# Patient Record
Sex: FEMALE
[De-identification: names, ages, dates, MRNs, and addresses within clinical notes are randomized per-mention and may not be internally consistent; named-entity substitution may affect disease eponyms.]

---

## 2020-09-15 PROBLEM — Z00.00 ENCOUNTER FOR PREVENTIVE HEALTH EXAMINATION: Status: ACTIVE | Noted: 2020-09-15

## 2020-09-18 ENCOUNTER — APPOINTMENT (OUTPATIENT)
Dept: ORTHOPEDIC SURGERY | Facility: CLINIC | Age: 55
End: 2020-09-18
Payer: OTHER MISCELLANEOUS

## 2020-09-18 VITALS — BODY MASS INDEX: 36.7 KG/M2 | RESPIRATION RATE: 16 BRPM | HEIGHT: 64 IN | WEIGHT: 215 LBS

## 2020-09-18 DIAGNOSIS — Z78.9 OTHER SPECIFIED HEALTH STATUS: ICD-10-CM

## 2020-09-18 DIAGNOSIS — Z80.49 FAMILY HISTORY OF MALIGNANT NEOPLASM OF OTHER GENITAL ORGANS: ICD-10-CM

## 2020-09-18 DIAGNOSIS — Z82.61 FAMILY HISTORY OF ARTHRITIS: ICD-10-CM

## 2020-09-18 DIAGNOSIS — Z87.448 PERSONAL HISTORY OF OTHER DISEASES OF URINARY SYSTEM: ICD-10-CM

## 2020-09-18 DIAGNOSIS — Z80.0 FAMILY HISTORY OF MALIGNANT NEOPLASM OF DIGESTIVE ORGANS: ICD-10-CM

## 2020-09-18 PROCEDURE — 73130 X-RAY EXAM OF HAND: CPT | Mod: 50

## 2020-09-18 PROCEDURE — 99214 OFFICE O/P EST MOD 30 MIN: CPT

## 2020-09-18 RX ORDER — TOPIRAMATE 50 MG/1
TABLET, FILM COATED ORAL
Refills: 0 | Status: ACTIVE | COMMUNITY

## 2020-09-18 RX ORDER — NARATRIPTAN 2.5 MG/1
TABLET, FILM COATED ORAL
Refills: 0 | Status: ACTIVE | COMMUNITY

## 2020-09-18 RX ORDER — CETIRIZINE HCL 10 MG
TABLET ORAL
Refills: 0 | Status: ACTIVE | COMMUNITY

## 2020-10-02 ENCOUNTER — APPOINTMENT (OUTPATIENT)
Dept: ORTHOPEDIC SURGERY | Facility: CLINIC | Age: 55
End: 2020-10-02
Payer: OTHER MISCELLANEOUS

## 2020-10-02 VITALS — BODY MASS INDEX: 36.7 KG/M2 | RESPIRATION RATE: 16 BRPM | HEIGHT: 64 IN | WEIGHT: 215 LBS

## 2020-10-02 DIAGNOSIS — M25.531 PAIN IN RIGHT WRIST: ICD-10-CM

## 2020-10-02 PROCEDURE — 73110 X-RAY EXAM OF WRIST: CPT | Mod: 50

## 2020-10-02 PROCEDURE — 73070 X-RAY EXAM OF ELBOW: CPT | Mod: RT

## 2020-10-02 PROCEDURE — 20605 DRAIN/INJ JOINT/BURSA W/O US: CPT | Mod: RT

## 2020-10-02 PROCEDURE — 99214 OFFICE O/P EST MOD 30 MIN: CPT | Mod: 25

## 2020-10-16 ENCOUNTER — APPOINTMENT (OUTPATIENT)
Dept: ORTHOPEDIC SURGERY | Facility: CLINIC | Age: 55
End: 2020-10-16
Payer: OTHER MISCELLANEOUS

## 2020-10-16 VITALS — RESPIRATION RATE: 16 BRPM | WEIGHT: 215 LBS | HEIGHT: 64 IN | BODY MASS INDEX: 36.7 KG/M2

## 2020-10-16 DIAGNOSIS — G56.01 CARPAL TUNNEL SYNDROME, RIGHT UPPER LIMB: ICD-10-CM

## 2020-10-16 DIAGNOSIS — M77.01 MEDIAL EPICONDYLITIS, RIGHT ELBOW: ICD-10-CM

## 2020-10-16 PROCEDURE — 99214 OFFICE O/P EST MOD 30 MIN: CPT

## 2020-11-06 ENCOUNTER — APPOINTMENT (OUTPATIENT)
Dept: ORTHOPEDIC SURGERY | Facility: CLINIC | Age: 55
End: 2020-11-06
Payer: OTHER MISCELLANEOUS

## 2020-11-06 VITALS — BODY MASS INDEX: 36.7 KG/M2 | HEIGHT: 64 IN | WEIGHT: 215 LBS | RESPIRATION RATE: 16 BRPM

## 2020-11-06 PROCEDURE — 99214 OFFICE O/P EST MOD 30 MIN: CPT | Mod: 25

## 2020-11-06 PROCEDURE — 99072 ADDL SUPL MATRL&STAF TM PHE: CPT

## 2020-11-06 PROCEDURE — 20550 NJX 1 TENDON SHEATH/LIGAMENT: CPT | Mod: F9

## 2020-11-08 ENCOUNTER — FORM ENCOUNTER (OUTPATIENT)
Age: 55
End: 2020-11-08

## 2020-12-08 ENCOUNTER — APPOINTMENT (OUTPATIENT)
Dept: ORTHOPEDIC SURGERY | Facility: CLINIC | Age: 55
End: 2020-12-08
Payer: OTHER MISCELLANEOUS

## 2020-12-08 VITALS — RESPIRATION RATE: 16 BRPM | WEIGHT: 215 LBS | BODY MASS INDEX: 36.7 KG/M2 | HEIGHT: 64 IN

## 2020-12-08 PROCEDURE — 99214 OFFICE O/P EST MOD 30 MIN: CPT | Mod: 25

## 2020-12-08 PROCEDURE — 20550 NJX 1 TENDON SHEATH/LIGAMENT: CPT | Mod: F9

## 2020-12-08 PROCEDURE — 99072 ADDL SUPL MATRL&STAF TM PHE: CPT

## 2020-12-08 NOTE — HISTORY OF PRESENT ILLNESS
[Right] : right hand dominant [FreeTextEntry1] : Pt here for recurrent right IV and V, mostly V TF despite 1 previous on November 6.  Pt is 4 weeks s/p injection.

## 2020-12-08 NOTE — PHYSICAL EXAM
[de-identified] : Physical exam shows the patient to be alert and oriented x3, capable of ambulation. The patient is well-developed and well-nourished in no apparent respiratory distress. Majority of the skin is intact bilaterally in the upper extremities without lymphadenopathy at the elbows.\par \par There is a negative Spurling test. There is no sensitivity or Tinel's over the axilla bilaterally in the area of the brachial plexus.\par \par The elbows have a symmetric and full range of motion with no pain upon forced flexion or extension bilaterally. There is no tenderness over the medial or lateral epicondyles bilaterally. The biceps and triceps are intact bilaterally with 5 over 5 strength. There is no joint effusion or instability of the medial and lateral collateral ligament complex bilaterally. There is no sensitivity of the median ulnar or radial nerves with provocative tests bilaterally.\par \par The wrists have a symmetric range of motion bilaterally. There is no tenderness over the scaphoid, scapholunate or lunotriquetral ligaments bilaterally. There is a negative Su test bilaterally. There is negative tenderness over the radial ulnar joint or TFCC and no evidence of instability bilaterally. No tenderness over the pisotriquetral or hamate hook or CMC joint bilaterally. There is 5 over 5 strength of the wrists bilaterally.\par \par There is a negative Finkelstein test bilaterally. There is no tenderness or instability of the MP PIP or DIP joints in the digits bilaterally with no evidence of digital malrotation.\par There is tenderness to the A1 pulley of the right fourth and fifth digits without locking. Upon compression. No instability of the joint or tendon, symmetric bilaterally\par There is no sensitivity or masses around the ulnar nerve at the level of the wrist bilaterally.\par \par \par There is 2+ pulses over the radial arteries bilaterally with good capillary refill.\par \par There is a positive Tinel's and a positive Phalen's test at 15 seconds on the right. There is also thenar atrophy but good opposition is present. The remaining intrinsic musculature has good strength bilaterally.\par \par Sensation is diminished in the median nerve distribution on the affected side. Ulnar nerve sensation is grossly intact\par

## 2020-12-08 NOTE — ASSESSMENT
[FreeTextEntry1] : Recurrent right fourth and fifth trigger fingers with one previous injection\par \par Persistent right carpal tunnel syndrome. Despite activity modifications, and splinting.\par \par The risks, benefits, alternatives and associated differential diagnosis was discussed with the patient. Options ranged from conservative care to surgical intervention were reviewed and all questions answered. Patient appeared to have an excellent understanding of the risks as well as differential diagnosis associated with this condition.\par \par She elected to proceed with a second injection for the right fourth and fifth trigger finger. Continued conservative management and splinting for the carpal tunnel.\par \par If symptoms recur. She is either considering a third injection or surgery.\par \par Surgery discussed was a right fourth and fifth trigger finger release and right carpal tunnel release

## 2021-01-13 ENCOUNTER — APPOINTMENT (OUTPATIENT)
Dept: ORTHOPEDIC SURGERY | Facility: CLINIC | Age: 56
End: 2021-01-13
Payer: OTHER MISCELLANEOUS

## 2021-01-13 VITALS — BODY MASS INDEX: 37.56 KG/M2 | HEIGHT: 64 IN | WEIGHT: 220 LBS

## 2021-01-13 DIAGNOSIS — M65.351 TRIGGER FINGER, RIGHT LITTLE FINGER: ICD-10-CM

## 2021-01-13 DIAGNOSIS — M65.341 TRIGGER FINGER, RIGHT RING FINGER: ICD-10-CM

## 2021-01-13 PROCEDURE — 99072 ADDL SUPL MATRL&STAF TM PHE: CPT

## 2021-01-13 PROCEDURE — 99214 OFFICE O/P EST MOD 30 MIN: CPT

## 2023-08-21 NOTE — REASON FOR VISIT
[Follow-Up Visit] : a follow-up visit for [Worker's Compensation] : this visit is related to worker's compensation [Trigger Finger] : trigger finger none

## 2023-12-07 ENCOUNTER — APPOINTMENT (OUTPATIENT)
Dept: HEPATOLOGY | Facility: CLINIC | Age: 58
End: 2023-12-07
Payer: COMMERCIAL

## 2023-12-07 VITALS
OXYGEN SATURATION: 98 % | BODY MASS INDEX: 38.93 KG/M2 | HEIGHT: 64 IN | RESPIRATION RATE: 17 BRPM | DIASTOLIC BLOOD PRESSURE: 84 MMHG | TEMPERATURE: 97.8 F | SYSTOLIC BLOOD PRESSURE: 138 MMHG | WEIGHT: 228 LBS | HEART RATE: 84 BPM

## 2023-12-07 LAB
A1AT SERPL-MCNC: 140 MG/DL
ALBUMIN SERPL ELPH-MCNC: 4.7 G/DL
ALP BLD-CCNC: 146 U/L
ALT SERPL-CCNC: 15 U/L
ANION GAP SERPL CALC-SCNC: 12 MMOL/L
AST SERPL-CCNC: 17 U/L
BASOPHILS # BLD AUTO: 0.07 K/UL
BASOPHILS NFR BLD AUTO: 1 %
BILIRUB DIRECT SERPL-MCNC: 0.1 MG/DL
BILIRUB INDIRECT SERPL-MCNC: 0.5 MG/DL
BILIRUB SERPL-MCNC: 0.7 MG/DL
BUN SERPL-MCNC: 13 MG/DL
CALCIUM SERPL-MCNC: 10.1 MG/DL
CERULOPLASMIN SERPL-MCNC: 30 MG/DL
CHLORIDE SERPL-SCNC: 105 MMOL/L
CO2 SERPL-SCNC: 26 MMOL/L
CREAT SERPL-MCNC: 0.88 MG/DL
EGFR: 77 ML/MIN/1.73M2
EOSINOPHIL # BLD AUTO: 0.04 K/UL
EOSINOPHIL NFR BLD AUTO: 0.6 %
GGT SERPL-CCNC: 22 U/L
GLUCOSE SERPL-MCNC: 100 MG/DL
HCT VFR BLD CALC: 45.6 %
HGB BLD-MCNC: 15.2 G/DL
IMM GRANULOCYTES NFR BLD AUTO: 0.1 %
LYMPHOCYTES # BLD AUTO: 2.39 K/UL
LYMPHOCYTES NFR BLD AUTO: 33.5 %
MAN DIFF?: NORMAL
MCHC RBC-ENTMCNC: 31.8 PG
MCHC RBC-ENTMCNC: 33.3 GM/DL
MCV RBC AUTO: 95.4 FL
MONOCYTES # BLD AUTO: 0.58 K/UL
MONOCYTES NFR BLD AUTO: 8.1 %
NEUTROPHILS # BLD AUTO: 4.04 K/UL
NEUTROPHILS NFR BLD AUTO: 56.7 %
PLATELET # BLD AUTO: 269 K/UL
POTASSIUM SERPL-SCNC: 4.4 MMOL/L
PROT SERPL-MCNC: 7.5 G/DL
RBC # BLD: 4.78 M/UL
RBC # FLD: 13.1 %
SODIUM SERPL-SCNC: 143 MMOL/L
WBC # FLD AUTO: 7.13 K/UL

## 2023-12-07 PROCEDURE — 99204 OFFICE O/P NEW MOD 45 MIN: CPT

## 2023-12-07 PROCEDURE — 76981 USE PARENCHYMA: CPT

## 2023-12-08 LAB
HEPATITIS A IGG ANTIBODY: NONREACTIVE
INR PPP: 0.96 RATIO
PT BLD: 10.9 SEC

## 2023-12-08 RX ORDER — ALBUTEROL 90 MCG
90 AEROSOL (GRAM) INHALATION
Refills: 0 | Status: ACTIVE | COMMUNITY

## 2023-12-08 RX ORDER — ROSUVASTATIN CALCIUM 5 MG/1
5 TABLET, FILM COATED ORAL
Refills: 0 | Status: ACTIVE | COMMUNITY

## 2023-12-10 LAB
ANA SER IF-ACNC: NEGATIVE
MITOCHONDRIA AB SER IF-ACNC: NORMAL

## 2023-12-12 LAB — SOLUBLE LIVER IGG SER IA-ACNC: 0.8

## 2024-01-11 ENCOUNTER — APPOINTMENT (OUTPATIENT)
Dept: HEPATOLOGY | Facility: CLINIC | Age: 59
End: 2024-01-11
Payer: COMMERCIAL

## 2024-01-11 VITALS
BODY MASS INDEX: 38.93 KG/M2 | HEART RATE: 87 BPM | OXYGEN SATURATION: 96 % | HEIGHT: 64 IN | RESPIRATION RATE: 16 BRPM | DIASTOLIC BLOOD PRESSURE: 85 MMHG | SYSTOLIC BLOOD PRESSURE: 142 MMHG | TEMPERATURE: 97.2 F | WEIGHT: 228 LBS

## 2024-01-11 PROCEDURE — 99214 OFFICE O/P EST MOD 30 MIN: CPT

## 2024-01-11 NOTE — HISTORY OF PRESENT ILLNESS
[de-identified] : History of Present Illness     Pt is a 58 year female with PMH class II obesity, HLD, migraines, who presents for initial visit for evaluation/management of elevated ALP. PCP: Dr. Janet Armas GI: Dr. Jacky Watson  The following information was obtained from review of pt's electronic medical records from the patient portal (on pt's phone): -9/26/23: WBC 7.0, Hgb 15.4, Plt 259. Na 144, K 4.1, Cr 0.96. AST/ALT 16/16, TB 0.6, , Alb 4.7. Ferritin 79, fe 99, iron sat 30% HBcAb total neg, HbsAg neg, HBsAb < 3.1 (HBV nonimmune). HCV Ab neg A1c 5.6%. Total cholesterol 145, HDL 59, TG 88, LDL 69. TSH wnl. UA neg. B12 wnl. Folic acid wnl. 25-OH Vit D 31.4. -10/14/23: Na 143, K 4.3, Cr 0.93. AST/ALT 17/14, TB 0.4, , Alb 4.7, TP 7.4. ALP isoenzyme liver fraction 65%, bone fraction 35%, intestinal fraction 0%. ASMA neg. IgG wnl, IgM wnl.  Liver Hx: Reports that she was told by her PCP that her ALP was high, was referred here. First told LFTs abnormal in 9/2023. Reports that she had been getting annual labs with her prior PCP (who passed away in 6/2023), she was never told her LFTs were abnormal. Had recent mild COVID infection over Thanksgiving. Denies f/c, cp, sob, cough, abd pain, n/v/d, hematochezia/melena. Denies pruritus. Prior liver biopsy: never Last EGD: 2022 (gets this done every 2 years for GERD; with Dr. Watson) - hiatal hernia, small gastric polyp removed, otherwise unremarkable. Polyp was benign Last COL: 2022 (Dr. Watson) - 1 small polyp removed. Otherwise exam was unremarkable. Gets this done every 2 years due to hx of CRC in father.  Had Abd US done at [facility] in 10/2023 - was told the "liver looked gray" Reports she gained 25# since 2020 due to COVID pandemic and menopause In her teens was 120-125#. Gained weight in her 30s with pregnancy. Hurt her knee and stopped dancing in late 40s. At that time weighed 170#.  Pharmacy:  [address]  Family Hx: Father - CRC (dx in his 50s), had AUD Denies family hx of liver disease, cirrhosis, liver cancer, other GI primary malignancy. Denies family hx of IBD, autoimmune disease.  Social Hx Tobacco: never Alcohol: 4 drinks/month. Denies hx of heavy EtOH use Drugs: occasional marijuana (smoke) 2x/month. Denies use of other illicit drugs in past/present. 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 Lives with 2 daughters (age 23, 32). Engaged, her partner who lives separately.  PSH: sinus surgery b/l hammertoe surgery uterine fibroid removal  Allergies: Codeine - chest tightness, SOB (no anaphylaxis) macrobid - hives Vicodin - generalized pruritus  CURRENT MEDS: topirate 25mg qhs - migraines -- started in 2019 naratriptan PRN migraines -- started in 2020 crestor 5mg daily - started in 6/2023 zyrtec PRN albuterol inhaler PRN "mushroom coffee" from [employer] - started 2 months ago - 10/2023 Deneis use of other herbals/supplements

## 2024-01-11 NOTE — ASSESSMENT
[FreeTextEntry1] : Patient feels ok No new event no NVD No rectal bleeding  No chest pain or SOB  No cough No dizziness or confusion No  symptoms   ROS otherwise negative   Meds reviewed  Labs and imaging reviewed   Impression/suggestion Elevated liver enzymes with elevated alkaline phosphatase but normal GGT ALT and AST are normal Sonogram did not show any evidence of biliary disease FibroScan did not show fibrosis or fatty liver This could be related to and nonhepatic source considering a normal GGT due to meds.  She ha s stopped taking topiramate.  Check labs including alkaline phosphatase fractionation test in 4 to 6 weeks.  If alkaline phosphatase is elevated and is from hepatic source, MRI MRCP will be done.

## 2024-04-11 ENCOUNTER — APPOINTMENT (OUTPATIENT)
Dept: HEPATOLOGY | Facility: CLINIC | Age: 59
End: 2024-04-11
Payer: COMMERCIAL

## 2024-04-11 VITALS
HEART RATE: 93 BPM | BODY MASS INDEX: 38.76 KG/M2 | WEIGHT: 227 LBS | OXYGEN SATURATION: 97 % | HEIGHT: 64 IN | SYSTOLIC BLOOD PRESSURE: 140 MMHG | TEMPERATURE: 97.6 F | DIASTOLIC BLOOD PRESSURE: 84 MMHG | RESPIRATION RATE: 16 BRPM

## 2024-04-11 DIAGNOSIS — R79.89 OTHER SPECIFIED ABNORMAL FINDINGS OF BLOOD CHEMISTRY: ICD-10-CM

## 2024-04-11 DIAGNOSIS — E87.5 HYPERKALEMIA: ICD-10-CM

## 2024-04-11 LAB
ALBUMIN SERPL ELPH-MCNC: 4.5 G/DL
ALP BLD-CCNC: 125 IU/L
ALP BLD-CCNC: 130 U/L
ALP BONE CFR SERPL: 29 %
ALP INTEST CFR SERPL: 0 %
ALP LIVER CFR SERPL: 71 %
ALT SERPL-CCNC: 15 U/L
ANION GAP SERPL CALC-SCNC: 11 MMOL/L
AST SERPL-CCNC: 16 U/L
BILIRUB SERPL-MCNC: 0.5 MG/DL
BUN SERPL-MCNC: 12 MG/DL
CALCIUM SERPL-MCNC: 10.2 MG/DL
CHLORIDE SERPL-SCNC: 103 MMOL/L
CO2 SERPL-SCNC: 28 MMOL/L
CREAT SERPL-MCNC: 0.95 MG/DL
EGFR: 70 ML/MIN/1.73M2
GLUCOSE SERPL-MCNC: 96 MG/DL
HCT VFR BLD CALC: 45.5 %
HGB BLD-MCNC: 14.9 G/DL
MCHC RBC-ENTMCNC: 31.2 PG
MCHC RBC-ENTMCNC: 32.7 GM/DL
MCV RBC AUTO: 95.4 FL
PLATELET # BLD AUTO: 287 K/UL
POTASSIUM SERPL-SCNC: 5.4 MMOL/L
PROT SERPL-MCNC: 7.2 G/DL
RBC # BLD: 4.77 M/UL
RBC # FLD: 13.3 %
SODIUM SERPL-SCNC: 142 MMOL/L
WBC # FLD AUTO: 6.69 K/UL

## 2024-04-11 PROCEDURE — 99213 OFFICE O/P EST LOW 20 MIN: CPT

## 2024-04-12 PROBLEM — R79.89 ELEVATED LFTS: Status: ACTIVE | Noted: 2023-12-07

## 2024-04-12 NOTE — HISTORY OF PRESENT ILLNESS
[de-identified] : Pt is a 59 year female with PMH class II obesity, HLD, migraines, who presents for initial visit for evaluation/management of elevated ALP. PCP: Dr. Janet Armas GI: Dr. Jacky Watson  The following information was obtained from review of pt's electronic medical records from the patient portal (on pt's phone): -9/26/23: WBC 7.0, Hgb 15.4, Plt 259. Na 144, K 4.1, Cr 0.96. AST/ALT 16/16, TB 0.6, , Alb 4.7. Ferritin 79, fe 99, iron sat 30% HBcAb total neg, HbsAg neg, HBsAb < 3.1 (HBV nonimmune). HCV Ab neg A1c 5.6%. Total cholesterol 145, HDL 59, TG 88, LDL 69. TSH wnl. UA neg. B12 wnl. Folic acid wnl. 25-OH Vit D 31.4. -10/14/23: Na 143, K 4.3, Cr 0.93. AST/ALT 17/14, TB 0.4, , Alb 4.7, TP 7.4. ALP isoenzyme liver fraction 65%, bone fraction 35%, intestinal fraction 0%. ASMA neg. IgG wnl, IgM wnl.  Liver Hx: Reports that she was told by her PCP that her ALP was high, was referred here. First told LFTs abnormal in 9/2023. Reports that she had been getting annual labs with her prior PCP (who passed away in 6/2023), she was never told her LFTs were abnormal. Had recent mild COVID infection over Thanksgiving. Denies f/c, cp, sob, cough, abd pain, n/v/d, hematochezia/melena. Denies pruritus. Prior liver biopsy: never Last EGD: 2022 (gets this done every 2 years for GERD; with Dr. Watson) - hiatal hernia, small gastric polyp removed, otherwise unremarkable. Polyp was benign Last COL: 2022 (Dr. Watson) - 1 small polyp removed. Otherwise exam was unremarkable. Gets this done every 2 years due to hx of CRC in father.  Had Abd US done at [facility] in 10/2023 - was told the "liver looked gray" Reports she gained 25# since 2020 due to COVID pandemic and menopause In her teens was 120-125#. Gained weight in her 30s with pregnancy. Hurt her knee and stopped dancing in late 40s. At that time weighed 170#.  Pharmacy:  [address]  Family Hx: Father - CRC (dx in his 50s), had AUD Denies family hx of liver disease, cirrhosis, liver cancer, other GI primary malignancy. Denies family hx of IBD, autoimmune disease.  Social Hx Tobacco: never Alcohol: 4 drinks/month. Denies hx of heavy EtOH use Drugs: occasional marijuana (smoke) 2x/month. Denies use of other illicit drugs in past/present. 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 Lives with 2 daughters (age 23, 32). Engaged, her partner who lives separately.  PSH: sinus surgery b/l hammertoe surgery uterine fibroid removal  Allergies: Codeine - chest tightness, SOB (no anaphylaxis) macrobid - hives Vicodin - generalized pruritus  CURRENT MEDS: naratriptan PRN migraines -- started in 2020 crestor 5mg daily - started in 6/2023 zyrtec PRN albuterol inhaler PRN "mushroom coffee" from [employer] - started 2 months ago - 10/2023 Deneis use of other herbals/supplements   Interval Hx 4/11/2024 Pt is doing much better than previously. Her labs are showing improvement for ALP. It has decreased from >145 to 130 now, In the interval, her migraines have improved leading her to be weaned off the topiramate. Given that Topiramate is may cause LFT elevation, this was likely the cause. As she is improving, she is returned to primary care. Of note, her potassium was elevated to 5.4 which is above her normal of ~4 previously. She recently had URI and dental instrumentation which is why it may be elevated. Repeat BMP ordered.

## 2024-04-12 NOTE — ASSESSMENT
[FreeTextEntry1] : 59F with drug induced Alk phos elevation improving since she was taken off topiramate. Potassium is measured above normal values 5.4 Patient was advised about the importance of repeating the labs in less than a week Sono did not show any biliary issues  Repeat BMP next week Return to primary care to follow labs. Monitor liver tests

## 2024-04-12 NOTE — HISTORY OF PRESENT ILLNESS
[de-identified] : Pt is a 59 year female with PMH class II obesity, HLD, migraines, who presents for initial visit for evaluation/management of elevated ALP. PCP: Dr. Janet Armas GI: Dr. Jacky Watson  The following information was obtained from review of pt's electronic medical records from the patient portal (on pt's phone): -9/26/23: WBC 7.0, Hgb 15.4, Plt 259. Na 144, K 4.1, Cr 0.96. AST/ALT 16/16, TB 0.6, , Alb 4.7. Ferritin 79, fe 99, iron sat 30% HBcAb total neg, HbsAg neg, HBsAb < 3.1 (HBV nonimmune). HCV Ab neg A1c 5.6%. Total cholesterol 145, HDL 59, TG 88, LDL 69. TSH wnl. UA neg. B12 wnl. Folic acid wnl. 25-OH Vit D 31.4. -10/14/23: Na 143, K 4.3, Cr 0.93. AST/ALT 17/14, TB 0.4, , Alb 4.7, TP 7.4. ALP isoenzyme liver fraction 65%, bone fraction 35%, intestinal fraction 0%. ASMA neg. IgG wnl, IgM wnl.  Liver Hx: Reports that she was told by her PCP that her ALP was high, was referred here. First told LFTs abnormal in 9/2023. Reports that she had been getting annual labs with her prior PCP (who passed away in 6/2023), she was never told her LFTs were abnormal. Had recent mild COVID infection over Thanksgiving. Denies f/c, cp, sob, cough, abd pain, n/v/d, hematochezia/melena. Denies pruritus. Prior liver biopsy: never Last EGD: 2022 (gets this done every 2 years for GERD; with Dr. Watson) - hiatal hernia, small gastric polyp removed, otherwise unremarkable. Polyp was benign Last COL: 2022 (Dr. Watson) - 1 small polyp removed. Otherwise exam was unremarkable. Gets this done every 2 years due to hx of CRC in father.  Had Abd US done at [facility] in 10/2023 - was told the "liver looked gray" Reports she gained 25# since 2020 due to COVID pandemic and menopause In her teens was 120-125#. Gained weight in her 30s with pregnancy. Hurt her knee and stopped dancing in late 40s. At that time weighed 170#.  Pharmacy:  [address]  Family Hx: Father - CRC (dx in his 50s), had AUD Denies family hx of liver disease, cirrhosis, liver cancer, other GI primary malignancy. Denies family hx of IBD, autoimmune disease.  Social Hx Tobacco: never Alcohol: 4 drinks/month. Denies hx of heavy EtOH use Drugs: occasional marijuana (smoke) 2x/month. Denies use of other illicit drugs in past/present. 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 Lives with 2 daughters (age 23, 32). Engaged, her partner who lives separately.  PSH: sinus surgery b/l hammertoe surgery uterine fibroid removal  Allergies: Codeine - chest tightness, SOB (no anaphylaxis) macrobid - hives Vicodin - generalized pruritus  CURRENT MEDS: naratriptan PRN migraines -- started in 2020 crestor 5mg daily - started in 6/2023 zyrtec PRN albuterol inhaler PRN "mushroom coffee" from [employer] - started 2 months ago - 10/2023 Deneis use of other herbals/supplements   Interval Hx 4/11/2024 Pt is doing much better than previously. Her labs are showing improvement for ALP. It has decreased from >145 to 130 now, In the interval, her migraines have improved leading her to be weaned off the topiramate. Given that Topiramate is may cause LFT elevation, this was likely the cause. As she is improving, she is returned to primary care. Of note, her potassium was elevated to 5.4 which is above her normal of ~4 previously. She recently had URI and dental instrumentation which is why it may be elevated. Repeat BMP ordered.

## 2024-04-12 NOTE — PHYSICAL EXAM
[General Appearance - Alert] : alert [General Appearance - In No Acute Distress] : in no acute distress [General Appearance - Well Nourished] : well nourished [] : no respiratory distress [Exaggerated Use Of Accessory Muscles For Inspiration] : no accessory muscle use [Heart Sounds Gallop] : no gallops [Bowel Sounds] : normal bowel sounds [Abdomen Soft] : soft [Oriented To Time, Place, And Person] : oriented to person, place, and time [Abdomen Tenderness] : non-tender [Impaired Insight] : insight and judgment were intact [Affect] : the affect was normal [Scleral Icterus] : No Scleral Icterus [Spider Angioma] : No spider angioma(s) were observed [Abdominal  Ascites] : no ascites [Ascites Tense] : ascites is not tense

## 2024-04-23 ENCOUNTER — LABORATORY RESULT (OUTPATIENT)
Age: 59
End: 2024-04-23

## 2024-04-26 ENCOUNTER — NON-APPOINTMENT (OUTPATIENT)
Age: 59
End: 2024-04-26